# Patient Record
Sex: MALE | Race: WHITE | NOT HISPANIC OR LATINO | URBAN - METROPOLITAN AREA
[De-identification: names, ages, dates, MRNs, and addresses within clinical notes are randomized per-mention and may not be internally consistent; named-entity substitution may affect disease eponyms.]

---

## 2017-01-19 ENCOUNTER — OFFICE (OUTPATIENT)
Dept: URBAN - METROPOLITAN AREA CLINIC 75 | Facility: CLINIC | Age: 57
End: 2017-01-19
Payer: COMMERCIAL

## 2017-01-19 VITALS
HEART RATE: 77 BPM | DIASTOLIC BLOOD PRESSURE: 82 MMHG | SYSTOLIC BLOOD PRESSURE: 136 MMHG | WEIGHT: 315 LBS | HEIGHT: 69 IN

## 2017-01-19 DIAGNOSIS — K92.1 MELENA: ICD-10-CM

## 2017-01-19 DIAGNOSIS — K21.9 GASTRO-ESOPHAGEAL REFLUX DISEASE WITHOUT ESOPHAGITIS: ICD-10-CM

## 2017-01-19 PROCEDURE — 99205 OFFICE O/P NEW HI 60 MIN: CPT | Performed by: INTERNAL MEDICINE

## 2017-01-19 NOTE — SERVICEHPINOTES
Thank you very much for allowing me to evaluate Mr. Rubio. As you know he is a pleasant 56-year-old gentleman with multiple medical problems. From a lower GI standpoint she had a recent episode of rectal bleeding. He says he went to the bathroom. He passed nothing but blood. There was no stool. Turn the water red. He says as a second time this has happened in the last 2-3 months. He says for the most part his bowels are regular, he'll have occasional constipation. There is no family history of polyps, colitis or colon cancer. He tells me he had an EGD and colonoscopy at [facility] 3 years ago and was found to have polyps. I reviewed his records from his primary care office and according to there notes he had a normal colonoscopy 5 years ago. We have checked in August the computer system and we cannot find a colonoscopy in their system at least since 2010. He also has a history of reflux. He is on omeprazole. He is not a smoker or drinker. He is morbidly obese. He does have occasional oropharyngeal-type symptoms but only with large pills. He has no problems with food of any says pork around sometimes feel like they get hung up when he swallows or go down the wrong way but he says they are dry. He again says he had an EGD at the time of his colonoscopy. There is no nausea or vomiting. There is no chest pain, melena or hematemesis. He is in no distress but does look chronically ill. He also tells me that he had an episode where his throat felt raw, he also had a choking sensation and it felt that he may have had an allergic reaction to cough syrup. He is now on Augmentin and another medication for what sounds like an upper respiratory infection. He also has had some shortness of air when he leans back. He does have a history of COPD and uses an inhaler.

## 2017-02-03 ENCOUNTER — ON CAMPUS - OUTPATIENT (OUTPATIENT)
Dept: URBAN - METROPOLITAN AREA HOSPITAL 108 | Facility: HOSPITAL | Age: 57
End: 2017-02-03
Payer: COMMERCIAL

## 2017-02-03 DIAGNOSIS — K62.5 HEMORRHAGE OF ANUS AND RECTUM: ICD-10-CM

## 2017-02-03 DIAGNOSIS — K64.0 FIRST DEGREE HEMORRHOIDS: ICD-10-CM

## 2017-02-03 PROCEDURE — 45378 DIAGNOSTIC COLONOSCOPY: CPT | Performed by: INTERNAL MEDICINE

## 2022-02-24 ENCOUNTER — OFFICE (OUTPATIENT)
Dept: URBAN - METROPOLITAN AREA CLINIC 75 | Facility: CLINIC | Age: 62
End: 2022-02-24
Payer: COMMERCIAL

## 2022-02-24 VITALS
SYSTOLIC BLOOD PRESSURE: 118 MMHG | HEART RATE: 83 BPM | HEIGHT: 69 IN | OXYGEN SATURATION: 99 % | DIASTOLIC BLOOD PRESSURE: 68 MMHG | WEIGHT: 287 LBS

## 2022-02-24 DIAGNOSIS — K92.1 MELENA: ICD-10-CM

## 2022-02-24 DIAGNOSIS — R16.0 HEPATOMEGALY, NOT ELSEWHERE CLASSIFIED: ICD-10-CM

## 2022-02-24 DIAGNOSIS — K21.9 GASTRO-ESOPHAGEAL REFLUX DISEASE WITHOUT ESOPHAGITIS: ICD-10-CM

## 2022-02-24 PROCEDURE — 99204 OFFICE O/P NEW MOD 45 MIN: CPT | Performed by: INTERNAL MEDICINE
